# Patient Record
Sex: MALE | Race: WHITE | NOT HISPANIC OR LATINO | Employment: FULL TIME | URBAN - METROPOLITAN AREA
[De-identification: names, ages, dates, MRNs, and addresses within clinical notes are randomized per-mention and may not be internally consistent; named-entity substitution may affect disease eponyms.]

---

## 2019-06-14 ENCOUNTER — HOSPITAL ENCOUNTER (EMERGENCY)
Facility: CLINIC | Age: 39
Discharge: HOME | End: 2019-06-14
Attending: NURSE PRACTITIONER | Admitting: NURSE PRACTITIONER
Payer: COMMERCIAL

## 2019-06-14 ENCOUNTER — NURSE TRIAGE (OUTPATIENT)
Dept: NURSING | Facility: CLINIC | Age: 39
End: 2019-06-14

## 2019-06-14 VITALS
SYSTOLIC BLOOD PRESSURE: 142 MMHG | RESPIRATION RATE: 16 BRPM | DIASTOLIC BLOOD PRESSURE: 91 MMHG | OXYGEN SATURATION: 96 % | TEMPERATURE: 98.3 F

## 2019-06-14 DIAGNOSIS — M54.40 ACUTE RIGHT-SIDED LOW BACK PAIN WITH SCIATICA: ICD-10-CM

## 2019-06-14 PROCEDURE — 99282 EMERGENCY DEPT VISIT SF MDM: CPT

## 2019-06-14 RX ORDER — CYCLOBENZAPRINE HCL 10 MG
10 TABLET ORAL 3 TIMES DAILY PRN
Qty: 20 TABLET | Refills: 0 | Status: SHIPPED | OUTPATIENT
Start: 2019-06-14 | End: 2019-06-20

## 2019-06-14 ASSESSMENT — ENCOUNTER SYMPTOMS
BACK PAIN: 1
NUMBNESS: 0
FEVER: 0

## 2019-06-14 NOTE — TELEPHONE ENCOUNTER
Back pain after long bike ride yesterday.  Patient report no sudden injury or strain.  Patient says it's his lower right back and pain radiates down right leg.  Patient also has increased weakness in that leg.  Reviewed care advice with caller.  FNA advised that patient should be evaluated now.  Caller verbalizes understanding.      Reason for Disposition    Weakness of a leg or foot (e.g., unable to bear weight, dragging foot)    Additional Information    Negative: Dangerous mechanism of injury (e.g., MVA, contact sports, trampoline, diving, fall > 10 feet or 3 meters)  (Exception: back pain began > 1 hour after injury)    Negative: [1] Weakness (i.e., paralysis, loss of muscle strength) of the leg(s) or foot AND [2] sudden onset after back injury    Negative: [1] Numbness (i.e., loss of sensation) of the leg(s) or foot AND [2] sudden onset after back injury    Negative: [1] Major bleeding (e.g., actively dripping or spurting) AND [2] can't be stopped    Negative: Bullet wound, knife wound, or other penetrating object    Negative: Shock suspected (e.g., cold/pale/clammy skin, too weak to stand, low BP, rapid pulse)    Negative: Sounds like a life-threatening emergency to the triager    Negative: [1] Injuries at more than 1 site AND [2] unsure which guideline to use    Negative: Injury to the neck    Negative: Injury to the tailbone    Negative: Back pain not from an injury    Negative: Back pain from overuse (work, exercise, gardening) OR from twisting, lifting, or bending injury    Negative: [1] SEVERE PAIN in kidney area (flank) AND [2] follows direct blow to that site    Negative: Blood in urine (red, pink, or tea-colored)    Negative: [1] Unable to urinate (or only a few drops) > 4 hours AND     [2] bladder feels very full (e.g., palpable bladder or strong urge to urinate)    Negative: [1] Urinary or bowel incontinence (i.e., loss of bladder or bowel control) AND [2] new onset    Negative: Numbness (loss of  sensation) in groin or rectal area    Negative: Skin is split open or gaping  (or length > 1/2 inch or 12 mm)    Negative: Puncture wound of back    Negative: [1] Bleeding AND [2] won't stop after 10 minutes of direct pressure (using correct technique)    Negative: Sounds like a serious injury to the triager    Protocols used: BACK INJURY-A-AH

## 2019-06-14 NOTE — ED TRIAGE NOTES
Pt c/o low back pain that radiates down right leg that started during a bike ride yesterday, pain is worse today.

## 2019-06-14 NOTE — ED PROVIDER NOTES
History     Chief Complaint:  Back Pain    HPI   Tyler Alford is an otherwise healthy 38 year old male who presents with back pain. The patient reports that yesterday, he went on a long bike ride and at the end of the bike ride, he started to feel pain in his right lower back whenever he stopped moving. Last night, he notes that his back was sore, but not unbearable pain. The patient reports that this morning, he is unable to move without pain. He notes that he does not have pain when he is horizontal or vertical without motion. He has been taking Advil with little relief. He denies fall, significant trauma, numbness in groin, loss of control of bladder or bowels, fevers, or hx of cancer. He states the pain did radiate down leg yesterday while biking but has not radiated as much today.    Allergies:  Penicillins    Medications:    Albenza    Past Medical History:    Asthma    Past Surgical History:    Orthopedic surgery  Removal of benign bony tumor  [place] teeth extraction    Family History:    Asthma  Diabetes    Social History:  Smoking status: Former  Alcohol use: Yes, 2-3 glasses/week  Drug use: No  PCP: Physician No Ref-Primary  Presents to the ED with wife  Marital Status:   [2]    Review of Systems   Constitutional: Negative for fever.   Genitourinary: Negative for enuresis.   Musculoskeletal: Positive for back pain.   Neurological: Negative for numbness.   All other systems reviewed and are negative.      Physical Exam     Patient Vitals for the past 24 hrs:   BP Temp Temp src Heart Rate Resp SpO2   06/14/19 1630 (!) 142/91 -- -- 63 16 96 %   06/14/19 1552 (!) 151/105 98.3  F (36.8  C) Temporal 74 16 94 %     Physical Exam  Constitutional: Pt appears well-developed and well-nourished. Non toxic appearing.   Head: Head moves freely with normal range of motion.   ENT: Oropharynx is clear and moist.   Eyes: Conjunctivae pink. EOMs intact.   Neck: Normal range of motion.    Cardiovascular: Regular  rate and rhythm. Normal heart sounds. No concerning murmur. Intact distal pulses: radial pulses 2+ on the right, 2+ on the left. Pedal pulses 2+ on the right, 2+ on the left.   Pulmonary/Chest: No respiratory distress.  Breath sounds normal.   Abdominal: Soft. Non-tender. No rebound, no guarding. No CVA tenderness.  Musculoskeletal: No spinal erythema, edema or heat to touch; no deformity or step offs.  Tender to palpation over right lower back musculature. Patellar and achilles reflexes 2+ and symmetric. Lower extremity strength symmetric 5/5 with knee flexion and extension and dorsiflexion and plantarflexion of the ankle.  Sensation intact to light touch.  Dorsalis pedis and posterior tibial pulses 2+ and symmetric. Cap refill <2 seconds.  Neurological: Oriented to person, place, and time. No focal deficits. No saddle anesthesia.   Skin: Skin is warm.     Emergency Department Course   Emergency Department Course:  Past medical records, nursing notes, and vitals reviewed.  1559: I performed an exam of the patient and obtained history, as documented above.    Findings and plan explained to the Patient and spouse. Patient discharged home with instructions regarding supportive care, medications, and reasons to return. The importance of close follow-up was reviewed.      Impression & Plan    Medical Decision Making:  Tyler Alford is a 38 year old male who presents for evaluation of back pain and radicular symptoms.  His pain has improved with interventions in the emergency department. He did not sustain any trauma, therefore x-rays are not necessary due to the low likelihood of fracture or subluxation. Advanced imaging with CT/MRI is not indicated at this time, but may be indicated in the future if symptoms fail to resolve.  There is no clinical evidence of cauda equina syndrome, discitis, spinal/epidural space hematoma or epidural abscess or other emergently worrisome etiology. The neurological exam is normal,  with the exception of the dermatomal symptoms noted.  The patient was advised that radiculopathy often takes significant time to resolve, and that follow up with primary care, orthopedics and /or neurology.  He will be discharged with pain medications to use as directed and will follow-up with PCP and/or orthopedics if no improvement in the next week.  He will return to ED with fevers, increasing pain, muscular weakness, or bowel or bladder dysfunction.     Critical Care time:  none    Diagnosis:    ICD-10-CM   1. Acute right-sided low back pain with sciatica M54.40     Disposition:  discharged to home    Discharge Medications:  Medication List   Started    cyclobenzaprine 10 MG tablet  Commonly known as:  FLEXERIL  10 mg, Oral, 3 TIMES DAILY PRN       Felix Mcwilliams  6/14/2019   [facility] EMERGENCY DEPARTMENT  Felix MCCLELLAN, am serving as a scribe at 3:59 PM on 6/14/2019 to document services personally performed by Susanna Day APRN based on my observations and the provider's statements to me.          Susanna Day APRN CNP  06/14/19 1739

## 2019-06-14 NOTE — ED AVS SNAPSHOT
[facility] Emergency Department  [address]  [facility] [address]  Phone:  [phone]  Fax:  [phone]                                    Tyler Alford   MRN: [id number]    Department:  [facility] Emergency Department   Date of Visit:  6/14/2019           After Visit Summary Signature Page    I have received my discharge instructions, and my questions have been answered. I have discussed any challenges I see with this plan with the nurse or doctor.    ..........................................................................................................................................  Patient/Patient Representative Signature      ..........................................................................................................................................  Patient Representative Print Name and Relationship to Patient    ..................................................               ................................................  Date                                   Time    ..........................................................................................................................................  Reviewed by Signature/Title    ...................................................              ..............................................  Date                                               Time          22EPIC Rev 08/18

## 2019-06-14 NOTE — DISCHARGE INSTRUCTIONS
Continue ibuprofen 600 mg every 6-8 hours.  Use flexeril as needed for severe pain.  No alcohol, driving or operating machinery while taking robaxin.  Continue to remain as active as possible; this will help you heal quicker.  Follow-up with your primary MD if not improved in one week.  Return to ED with loss of bowel or bladder control, pelvic numbness, weakness in leg, fevers, or any further concerns.       Discharge Instructions  Back Pain  You were seen today for back pain. Back pain can have many causes, but most will get better without surgery or other specific treatment. Sometimes there is a herniated (?slipped?) disc. We do not usually do MRI scans to look for these right away, since most herniated discs will get better on their own with time.  Today, we did not find any evidence that your back pain was caused by a serious condition. However, sometimes symptoms develop over time and cannot be found during an emergency visit, so it is very important that you follow up with your primary provider.  Generally, every Emergency Department visit should have a follow-up clinic visit with either a primary or a specialty clinic/provider. Please follow-up as instructed by your emergency provider today.    Return to the Emergency Department if:  You develop a fever with your back pain.   You have weakness or change in sensation in one or both legs.  You lose control of your bowels or bladder, or cannot empty your bladder (cannot pee).  Your pain gets much worse.     Follow-up with your provider:  Unless your pain has completely gone away, please make an appointment with your provider within one week. Most of the routine care for back pain is available in a clinic and not the Emergency Department. You may need further management of your back pain, such as more pain medication, imaging such as an X-ray or MRI, or physical therapy.    What can I do to help myself?  Remain Active -- People are often afraid that they will  hurt their back further or delay recovery by remaining active, but this is one of the best things you can do for your back. In fact, staying in bed for a long time to rest is not recommended. Studies have shown that people with low back pain recover faster when they remain active. Movement helps to bring blood flow to the muscles and relieve muscle spasms as well as preventing loss of muscle strength.  Heat -- Using a heating pad can help with low back pain during the first few weeks. Do not sleep with a heating pad, as you can be burned.   Pain medications - You may take a pain medication such as Tylenol  (acetaminophen), Advil , Motrin  (ibuprofen) or Aleve  (naproxen).  If you were given a prescription for medicine here today, be sure to read all of the information (including the package insert) that comes with your prescription.  This will include important information about the medicine, its side effects, and any warnings that you need to know about.  The pharmacist who fills the prescription can provide more information and answer questions you may have about the medicine.  If you have questions or concerns that the pharmacist cannot address, please call or return to the Emergency Department.   Remember that you can always come back to the Emergency Department if you are not able to see your regular provider in the amount of time listed above, if you get any new symptoms, or if there is anything that worries you.

## 2024-02-09 ENCOUNTER — OFFICE VISIT (OUTPATIENT)
Dept: URGENT CARE | Facility: URGENT CARE | Age: 44
End: 2024-02-09
Payer: COMMERCIAL

## 2024-02-09 VITALS
OXYGEN SATURATION: 98 % | SYSTOLIC BLOOD PRESSURE: 124 MMHG | HEART RATE: 66 BPM | TEMPERATURE: 98.1 F | DIASTOLIC BLOOD PRESSURE: 83 MMHG | WEIGHT: 185 LBS

## 2024-02-09 DIAGNOSIS — H61.23 EXCESSIVE CERUMEN IN BOTH EAR CANALS: Primary | ICD-10-CM

## 2024-02-09 DIAGNOSIS — J06.9 VIRAL URI WITH COUGH: ICD-10-CM

## 2024-02-09 PROCEDURE — 69209 REMOVE IMPACTED EAR WAX UNI: CPT | Mod: 50 | Performed by: PHYSICIAN ASSISTANT

## 2024-02-09 PROCEDURE — 99203 OFFICE O/P NEW LOW 30 MIN: CPT | Mod: 25 | Performed by: PHYSICIAN ASSISTANT

## 2024-02-09 ASSESSMENT — ENCOUNTER SYMPTOMS
RHINORRHEA: 1
COUGH: 1
HEADACHES: 1
SINUS PAIN: 1
FEVER: 0

## 2024-02-10 NOTE — PROGRESS NOTES
Assessment & Plan:        ICD-10-CM    1. Excessive cerumen in both ear canals  H61.23 MD REMOVAL IMPACTED CERUMEN IRRIGATION/LVG UNILAT      2. Viral URI with cough  J06.9             Plan/Clinical Decision Making:    Patient presents with URI symptoms for 3 days with sinus pressure, headache, and left ear symptoms.  Bilateral tympanic membranes blocked by wax.  MA performed ear irrigation bilaterally. Patient had decreased symptoms with removal of wax.   Can treat cold symptoms with OTC cold medications as needed. Tylenol, ibuprofen for headache.   Use debrox as needed for excess ear wax.       Return if symptoms worsen or fail to improve, for in 3-5 days.     At the end of the encounter, I discussed results, diagnosis, medications. Discussed red flags for immediate return to clinic/ER, as well as indications for follow up if no improvement. Patient understood and agreed to plan. Patient was stable for discharge.        Shakila Soliman PA-C on 2/9/2024 at 7:41 PM          Subjective:     HPI:    Tyler is a 43 year old male who presents to clinic today for the following health issues:  Chief Complaint   Patient presents with    Urgent Care    URI     Pt had cold, sneezing, congestion, cough has gone through. Now face pressure, ear pressure , hearing some things in left ear.        HPI    Patient developed cold symptoms several days ago with cough and nasal congestion. Started to get better, but having sinus headache, and left ear symptoms. Has pressure and abnormal noises in left ear.   Has tried Tylenol, OTC cold medications, Ibuprofen- not much help.     Review of Systems   Constitutional:  Negative for fever.   HENT:  Positive for congestion, ear pain, rhinorrhea, sinus pain and tinnitus (ringing sound in left ear).    Respiratory:  Positive for cough.    Neurological:  Positive for headaches.         Patient Active Problem List   Diagnosis    Mild intermittent asthma    Adjustment disorder with mixed anxiety  and depressed mood    CARDIOVASCULAR SCREENING; LDL GOAL LESS THAN 160        Past Medical History:   Diagnosis Date    Mild intermittent asthma     does not take anything        Social History     Tobacco Use    Smoking status: Former    Smokeless tobacco: Not on file   Substance Use Topics    Alcohol use: Yes     Comment: 2-3 glasses per wk occ             Objective:     Vitals:    02/09/24 1937   BP: 124/83   Pulse: 66   Temp: 98.1  F (36.7  C)   TempSrc: Tympanic   SpO2: 98%   Weight: 83.9 kg (185 lb)         Physical Exam   EXAM:   Pleasant, alert, appropriate appearance. NAD.  Head Exam: Normocephalic, atraumatic.  Eye Exam:  PERRLA, EOMI, non icteric/injection.    Ear Exam: Bilateral TMs blocked with wax. After lavage normal appearing tympanic membranes grey without bulging. Normal canals.  Normal pinna.  Nose Exam: Normal external nose.    OroPharynx Exam:  Moist mucous membranes. No erythema, pharynx without exudate or hypertrophy.  Neck/Thyroid Exam:  No LAD.  No nodules or enlargement.  Chest/Respiratory Exam: CTAB.  Cardiovascular Exam: RRR. No murmur or rubs.      Results:  No results found for any visits on 02/09/24.